# Patient Record
Sex: FEMALE | Race: WHITE | NOT HISPANIC OR LATINO | Employment: OTHER | URBAN - METROPOLITAN AREA
[De-identification: names, ages, dates, MRNs, and addresses within clinical notes are randomized per-mention and may not be internally consistent; named-entity substitution may affect disease eponyms.]

---

## 2017-01-11 ENCOUNTER — OFFICE VISIT (OUTPATIENT)
Dept: PULMONOLOGY | Facility: CLINIC | Age: 57
End: 2017-01-11

## 2017-01-11 VITALS
WEIGHT: 98 LBS | TEMPERATURE: 98 F | SYSTOLIC BLOOD PRESSURE: 158 MMHG | RESPIRATION RATE: 24 BRPM | OXYGEN SATURATION: 92 % | DIASTOLIC BLOOD PRESSURE: 78 MMHG | BODY MASS INDEX: 15.75 KG/M2 | HEIGHT: 66 IN | HEART RATE: 158 BPM

## 2017-01-11 DIAGNOSIS — F41.8 DEPRESSION WITH ANXIETY: ICD-10-CM

## 2017-01-11 DIAGNOSIS — J96.11 CHRONIC RESPIRATORY FAILURE WITH HYPOXIA (HCC): ICD-10-CM

## 2017-01-11 DIAGNOSIS — J43.2 CENTRILOBULAR EMPHYSEMA (HCC): Primary | ICD-10-CM

## 2017-01-11 DIAGNOSIS — F17.211 CIGARETTE NICOTINE DEPENDENCE IN REMISSION: ICD-10-CM

## 2017-01-11 PROCEDURE — 94620 PR PULMONARY STRESS TESTING,SIMPLE: CPT | Performed by: NURSE PRACTITIONER

## 2017-01-11 PROCEDURE — 99214 OFFICE O/P EST MOD 30 MIN: CPT | Performed by: NURSE PRACTITIONER

## 2017-01-11 NOTE — PROGRESS NOTES
[facility] Pulmonary Follow up    CHIEF COMPLAINT    Shortness of breath, COPD    HISTORY OF PRESENT ILLNESS    Mikaela Edward is a 56 y.o.female here today for follow-up on her COPD as well as renewal of her oxygen.  She had been followed by Dr. Foreman who had been ordering her oxygen.  She had just been released from their practice at this time.  She would like to get a portable concentrator.  We had a very lengthy discussion about pulse dose versus continuous dose oxygen, with her lung function she requires continuous oxygen at 2-3 L.  Her last FEV1 in 2016 was 17%.  As well as the [employer] companies to have it available.    Her COPD has been fairly stable around 2 weeks ago she did have a bit of a cough and congestion and took a round of prednisone and antibiotics.  She is on chronic prednisone 10 mg daily but has been on frequent tapers over the last 6 months.  She also takes Daliresp for several years, she also uses her albuterol nebs 4 times a day and Combivent 4 times a day, and Symbicort twice a day.  She is chronically on azithromycin suppressive therapy.  She does have frequent exacerbations as well as frequent ER visits.  Her last was on December 4 with complaints of shortness of breath.  Her chest x-ray showed chronic emphysematous changes without any acute process.  She also had an ABG done that was 7.45/43/88.    She does wear her oxygen continuously at 2-3 L.  She also wears a BiPAP at night for chronic respiratory failure.  She is compliant with both.    She has had an evaluation at the [facility].  She still has some testing to go to see if she could be a consideration.        Patient Active Problem List   Diagnosis   • Depression with anxiety   • PVD (peripheral vascular disease)   • Elevated d-dimer   • Emphysema/COPD   • GERD (gastroesophageal reflux disease)   • IBS (irritable bowel syndrome)   • Arthritis   • Chronic respiratory failure   • Nicotine dependence       No Known  Allergies    Current Outpatient Prescriptions:   •  albuterol (ACCUNEB) 1.25 MG/3ML nebulizer solution, Take 3 mL by nebulization Every 4 (Four) Hours As Needed for wheezing or shortness of air., Disp: 3 mL, Rfl: 2  •  albuterol (PROVENTIL HFA;VENTOLIN HFA) 108 (90 BASE) MCG/ACT inhaler, Inhale 2 puffs daily as needed for wheezing., Disp: , Rfl:   •  albuterol (VENTOLIN HFA) 108 (90 BASE) MCG/ACT inhaler, INHALE TWO PUFFS BY MOUTH EVERY 4 HOURS AS NEEDED FOR WHEEZING, Disp: 1 inhaler, Rfl: 4  •  azithromycin (ZITHROMAX) 250 MG tablet, Take 2 tablets the first day, then 1 tablet daily for 4 days.  Indications: COPD exacerbation, Disp: 30 tablet, Rfl: 12  •  budesonide-formoterol (SYMBICORT) 160-4.5 MCG/ACT inhaler, Inhale 2 puffs 2 (two) times a day., Disp: , Rfl:   •  ipratropium-albuterol (COMBIVENT RESPIMAT)  MCG/ACT inhaler, Inhale 1 puff 4 (four) times a day as needed for wheezing., Disp: , Rfl:   •  ipratropium-albuterol (DUO-NEB) 0.5-2.5 mg/mL nebulizer, INHALE ONE VIAL IN NEBULIZER FOUR TIMES A DAY, Disp: 3 mL, Rfl: 1  •  LORazepam (ATIVAN) 1 MG tablet, Take 0.5-1 tablets by mouth 2 (Two) Times a Day., Disp: 60 tablet, Rfl: 0  •  montelukast (SINGULAIR) 10 MG tablet, Take 10 mg by mouth daily as needed., Disp: , Rfl:   •  predniSONE (DELTASONE) 10 MG tablet, Take 1 tablet by mouth daily., Disp: 30 tablet, Rfl: 11  •  predniSONE (DELTASONE) 20 MG tablet, Take 1 tablet by mouth Daily. 3 PO QD X 3 DAYS 2 PO QD X 3 DAYS 1 PO QD X 3 DAYS, Disp: 18 tablet, Rfl: 0  •  roflumilast (DALIRESP) 500 MCG tablet tablet, Take 500 mcg by mouth daily., Disp: , Rfl:   •  SPIRIVA HANDIHALER 18 MCG per inhalation capsule, INHALE THE ENTIRE CONTENTS OF 1 CAPSULE ONCE A DAY USING HANDIHALER DEVICE, Disp: 30 capsule, Rfl: 4  •  traMADol (ULTRAM) 50 MG tablet, Take 2 tablets by mouth 3 (Three) Times a Day., Disp: 180 tablet, Rfl: 0  MEDICATION LIST AND ALLERGIES REVIEWED.    Social History   Substance Use Topics   • Smoking  "status: Former Smoker     Packs/day: 1.00     Types: Cigarettes     Quit date: 4/1/2016   • Smokeless tobacco: Never Used   • Alcohol use No       FAMILY AND SOCIAL HISTORY REVIEWED.    Review of Systems   Constitutional: Positive for appetite change and fatigue. Negative for chills, fever and unexpected weight change.   HENT: Negative for congestion, nosebleeds, postnasal drip, rhinorrhea, sinus pressure and trouble swallowing.    Respiratory: Positive for choking and shortness of breath. Negative for cough, chest tightness and wheezing.    Cardiovascular: Negative for chest pain and leg swelling.   Gastrointestinal: Negative for abdominal pain, constipation, diarrhea, nausea and vomiting.   Genitourinary: Negative for dysuria, frequency, hematuria and urgency.   Musculoskeletal: Positive for gait problem. Negative for myalgias.   Neurological: Positive for weakness. Negative for dizziness, numbness and headaches.   All other systems reviewed and are negative.  .    Visit Vitals   • /78   • Pulse (!) 158   • Temp 98 °F (36.7 °C)   • Resp 24   • Ht 66\" (167.6 cm)   • Wt 98 lb (44.5 kg)   • SpO2 92%   • BMI 15.82 kg/m2     Physical Exam   Constitutional: She is oriented to person, place, and time. She appears well-developed and well-nourished.   HENT:   Head: Normocephalic and atraumatic.   Eyes: EOM are normal. Pupils are equal, round, and reactive to light.   Neck: Normal range of motion. Neck supple.   Cardiovascular: Normal rate and regular rhythm.    No murmur heard.  Pulmonary/Chest: Effort normal and breath sounds normal. No respiratory distress. She has no wheezes. She has no rales.   Coarse rhonchi with wheezing   Abdominal: Soft. Bowel sounds are normal. She exhibits no distension.   Musculoskeletal: Normal range of motion. She exhibits no edema.   Neurological: She is alert and oriented to person, place, and time.   Skin: Skin is warm and dry. No erythema.   Psychiatric: She has a normal mood and " affect. Her behavior is normal.   Vitals reviewed.      RESULTS    6 minute walk test, failed use of a portable concentrator with pulse dose with hypoxia and anxiety.    Refused PFTs    PROBLEM LIST    Problem List Items Addressed This Visit        Respiratory    Emphysema/COPD - Primary    Chronic respiratory failure       Other    Depression with anxiety    Nicotine dependence            DISCUSSION    With her chronic respiratory failure as well as end-stage lung disease and FEV1 of 17% she is not able to wear pulse dose oxygen.  She'll continue with continuous oxygen at this time.  Taylor also had a long talk with him regarding other possible options for more portability of her oxygen.  She is very small framed and very weak and cannot carry larger tanks.  Continue on BiPAP at night for her chronic respiratory failure.    Continue on her current COPD regimen, we did not adjust her inhalers today.    Nadia Tucker, APRN  01/11/20172:31 PM  Electronically signed     Please note that portions of this note were completed with a voice recognition program. Efforts were made to edit the dictations, but occasionally words are mistranscribed.      CC: Rosita Foreman MD

## 2017-01-13 DIAGNOSIS — J44.9 CHRONIC OBSTRUCTIVE PULMONARY DISEASE, UNSPECIFIED COPD TYPE (HCC): ICD-10-CM

## 2017-01-13 RX ORDER — LORAZEPAM 1 MG/1
.5-1 TABLET ORAL 2 TIMES DAILY
Qty: 60 TABLET | Refills: 0 | OUTPATIENT
Start: 2017-01-13

## 2017-01-13 RX ORDER — BUDESONIDE AND FORMOTEROL FUMARATE DIHYDRATE 160; 4.5 UG/1; UG/1
2 AEROSOL RESPIRATORY (INHALATION)
Qty: 1 INHALER | Refills: 11 | Status: SHIPPED | OUTPATIENT
Start: 2017-01-13

## 2017-01-13 RX ORDER — TRAMADOL HYDROCHLORIDE 50 MG/1
100 TABLET ORAL 3 TIMES DAILY
Qty: 180 TABLET | Refills: 0 | OUTPATIENT
Start: 2017-01-13

## 2017-01-13 RX ORDER — ROFLUMILAST 500 UG/1
500 TABLET ORAL DAILY
Qty: 30 TABLET | Refills: 11 | Status: SHIPPED | OUTPATIENT
Start: 2017-01-13

## 2017-01-13 RX ORDER — AZITHROMYCIN 500 MG/1
500 TABLET, FILM COATED ORAL DAILY
Qty: 30 TABLET | Refills: 6 | Status: SHIPPED | OUTPATIENT
Start: 2017-01-13

## 2017-01-25 ENCOUNTER — DOCUMENTATION (OUTPATIENT)
Dept: PULMONOLOGY | Facility: CLINIC | Age: 57
End: 2017-01-25

## 2017-01-26 ENCOUNTER — TELEPHONE (OUTPATIENT)
Dept: PULMONOLOGY | Facility: CLINIC | Age: 57
End: 2017-01-26

## 2017-01-26 NOTE — TELEPHONE ENCOUNTER
"Ms Edward called to discuss her breathing troubles. She reports she has been having increasing difficulty catching her breath. I had discussed her case with Sri yesterday when she called requesting to have her Ativan filled early. Ms Edward reports she lost 1 week worth of her medication during a recent move. A 20 mg prednisone taper was prescribed for her instead as she thought she was having an exacerbation.     Ms Edward once again mentioned her Ativan, stating that she thinks some of her breathing troubles come from anxiety. While this is certainly possible, I explained to Ms Edward that I would be unable to refill this for her. Our office did not originally prescribe her Ativan, her PCP did. She has since been \"kicked out\" of that office. The pharmacy let her know that she could refill her prescription 2 days early, which would be soon to my understanding since Ms Edward said she ran out this weekend.     Ms Edward also asked if she should take more prednisone. I advised her to take the 20 mg dose that was ordered yesterday. We discussed how increasing her dose even more might contribute to her feelings of anxiety. I encouraged her to continue using her nebs and Combivent 4 times a day as she has been,  her Ativan 2 days early as the pharmacy will allow, and discuss her anxiety issues with her new PCP at her upcoming appointment.       "

## 2019-06-24 ENCOUNTER — OUTSIDE FACILITY SERVICE (OUTPATIENT)
Dept: SURGERY | Facility: CLINIC | Age: 59
End: 2019-06-24

## 2019-06-24 PROCEDURE — 99231 SBSQ HOSP IP/OBS SF/LOW 25: CPT | Performed by: SURGERY
